# Patient Record
Sex: FEMALE | Race: WHITE | NOT HISPANIC OR LATINO | Employment: OTHER | URBAN - METROPOLITAN AREA
[De-identification: names, ages, dates, MRNs, and addresses within clinical notes are randomized per-mention and may not be internally consistent; named-entity substitution may affect disease eponyms.]

---

## 2023-04-28 ENCOUNTER — NEW PATIENT COMPREHENSIVE (OUTPATIENT)
Dept: URBAN - METROPOLITAN AREA CLINIC 10 | Facility: CLINIC | Age: 28
End: 2023-04-28

## 2023-04-28 DIAGNOSIS — H02.886: ICD-10-CM

## 2023-04-28 DIAGNOSIS — H52.13: ICD-10-CM

## 2023-04-28 DIAGNOSIS — H02.883: ICD-10-CM

## 2023-04-28 PROCEDURE — MISCOPTOS MISCELLANEOUS, OPTOS

## 2023-04-28 PROCEDURE — 92004 COMPRE OPH EXAM NEW PT 1/>: CPT

## 2023-04-28 PROCEDURE — 92015 DETERMINE REFRACTIVE STATE: CPT

## 2023-04-28 ASSESSMENT — KERATOMETRY
OD_K1POWER_DIOPTERS: 44
OS_K2POWER_DIOPTERS: 45
OD_AXISANGLE2_DEGREES: 75
OS_AXISANGLE2_DEGREES: 104
OD_K2POWER_DIOPTERS: 44.75
OS_K1POWER_DIOPTERS: 44.50
OD_AXISANGLE_DEGREES: 165
OS_AXISANGLE_DEGREES: 14

## 2023-04-28 ASSESSMENT — VISUAL ACUITY
OD_CC: 20/30-1
OS_CC: 20/25-2

## 2023-04-28 ASSESSMENT — TONOMETRY
OD_IOP_MMHG: 16
OS_IOP_MMHG: 17

## 2024-06-11 ENCOUNTER — APPOINTMENT (OUTPATIENT)
Dept: ORTHOPEDIC SURGERY | Facility: CLINIC | Age: 29
End: 2024-06-11
Payer: COMMERCIAL

## 2024-06-11 ENCOUNTER — NON-APPOINTMENT (OUTPATIENT)
Age: 29
End: 2024-06-11

## 2024-06-11 VITALS — HEIGHT: 67 IN | BODY MASS INDEX: 40.81 KG/M2 | WEIGHT: 260 LBS

## 2024-06-11 PROBLEM — Z00.00 ENCOUNTER FOR PREVENTIVE HEALTH EXAMINATION: Status: ACTIVE | Noted: 2024-06-11

## 2024-06-11 PROCEDURE — 99204 OFFICE O/P NEW MOD 45 MIN: CPT | Mod: 57

## 2024-06-12 NOTE — HISTORY OF PRESENT ILLNESS
[de-identified] : Elda Vyas is here with her mother. She has excruciating left-sided leg pain. She is walker-bound. Her symptoms have been present for over six months. She has been unable to work due to the severity of her symptoms, which have been recalcitrant to physiotherapy, multiple epidural steroid injections, anti-inflammatories, activity modification, pain medication, weight loss, home exercise program, and other.

## 2024-06-12 NOTE — PLAN
[TextEntry] : Based on the severity of her symptoms as well as her weakness and the recalcitrant nature of her disc herniation at L5-S1, I've recommended decompressive laminectomies bilaterally at L5-S1. The disc herniation is predominantly central, but it affects both traversing S1 nerve roots, left greater than right. I will examine and probe the disc as well. If it is firm and contained, it will be left intact. If there's a large, soft component, this will be removed. The aforementioned surgery including the risks/alternatives/benefits were explained in detail to the patient as well as her mother who both verbalized understanding and wish to proceed. Nivia will contact them regarding scheduling.

## 2024-06-12 NOTE — PHYSICAL EXAM
[Walker] : ambulates with walker [Normal] : Alert and in no acute distress [de-identified] :  She has weakness of her gastroc and hamstrings bilaterally Lt worse than Rt. She has a strongly positive straight leg raise b/l. [de-identified] : Large central L5-S1 disc herniation on MRI.

## 2024-06-12 NOTE — END OF VISIT
[FreeTextEntry3] :   Documented by Jose Linder acting as a scribe for Dr. Nico Richard. 06/11/2024   All medical record entries made by the Jose Linder (Scribe) were at my, Dr. Nico Richard. direction and personally dictated by me on 06/11/2024. I have reviewed the chart and agree that the record accurately reflects my personal performance of the history, physical exam, assessment and plan. I have also personally directed, reviewed, and agreed with the chart.

## 2024-06-13 DIAGNOSIS — M51.26 OTHER INTERVERTEBRAL DISC DISPLACEMENT, LUMBAR REGION: ICD-10-CM

## 2024-06-13 RX ORDER — DIAZEPAM 5 MG/1
5 TABLET ORAL 3 TIMES DAILY
Qty: 30 | Refills: 0 | Status: ACTIVE | COMMUNITY
Start: 2024-06-13 | End: 1900-01-01

## 2024-06-27 ENCOUNTER — APPOINTMENT (OUTPATIENT)
Dept: ORTHOPEDIC SURGERY | Facility: CLINIC | Age: 29
End: 2024-06-27

## 2025-05-17 ENCOUNTER — NON-APPOINTMENT (OUTPATIENT)
Age: 30
End: 2025-05-17